# Patient Record
Sex: MALE | HISPANIC OR LATINO | Employment: STUDENT | URBAN - METROPOLITAN AREA
[De-identification: names, ages, dates, MRNs, and addresses within clinical notes are randomized per-mention and may not be internally consistent; named-entity substitution may affect disease eponyms.]

---
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
## 2023-08-22 NOTE — TELEPHONE ENCOUNTER
----- Message from Karen Belcher sent at 4/24/2017  4:24 PM CDT -----  Contact: pt mom #[phone]  Mom would like a call back in regards to pt rx-refill.   Bcc Histology Text: There were numerous aggregates of basaloid cells.